# Patient Record
Sex: MALE | Race: WHITE | NOT HISPANIC OR LATINO | Employment: FULL TIME | RURAL
[De-identification: names, ages, dates, MRNs, and addresses within clinical notes are randomized per-mention and may not be internally consistent; named-entity substitution may affect disease eponyms.]

---

## 2018-12-18 ENCOUNTER — OFFICE VISIT (OUTPATIENT)
Dept: FAMILY MEDICINE CLINIC | Facility: CLINIC | Age: 23
End: 2018-12-18

## 2018-12-18 VITALS
SYSTOLIC BLOOD PRESSURE: 119 MMHG | BODY MASS INDEX: 33.05 KG/M2 | DIASTOLIC BLOOD PRESSURE: 70 MMHG | TEMPERATURE: 97.7 F | RESPIRATION RATE: 16 BRPM | HEART RATE: 87 BPM | WEIGHT: 244 LBS | HEIGHT: 72 IN | OXYGEN SATURATION: 97 %

## 2018-12-18 DIAGNOSIS — R05.9 COUGH: ICD-10-CM

## 2018-12-18 DIAGNOSIS — H66.004 RECURRENT ACUTE SUPPURATIVE OTITIS MEDIA OF RIGHT EAR WITHOUT SPONTANEOUS RUPTURE OF TYMPANIC MEMBRANE: Primary | ICD-10-CM

## 2018-12-18 PROCEDURE — 99203 OFFICE O/P NEW LOW 30 MIN: CPT | Performed by: NURSE PRACTITIONER

## 2018-12-18 RX ORDER — OFLOXACIN 3 MG/ML
SOLUTION AURICULAR (OTIC)
COMMUNITY
Start: 2018-10-07 | End: 2019-11-07

## 2018-12-18 RX ORDER — FLUTICASONE PROPIONATE 50 MCG
2 SPRAY, SUSPENSION (ML) NASAL DAILY
Qty: 1 BOTTLE | Refills: 0 | Status: SHIPPED | OUTPATIENT
Start: 2018-12-18 | End: 2019-11-07

## 2018-12-18 RX ORDER — AZITHROMYCIN 250 MG/1
TABLET, FILM COATED ORAL
Qty: 6 TABLET | Refills: 0 | Status: SHIPPED | OUTPATIENT
Start: 2018-12-18 | End: 2019-11-07

## 2018-12-18 RX ORDER — AMOXICILLIN AND CLAVULANATE POTASSIUM 875; 125 MG/1; MG/1
TABLET, FILM COATED ORAL
COMMUNITY
Start: 2018-12-11 | End: 2019-11-07

## 2018-12-18 NOTE — PROGRESS NOTES
Chief Complaint   Patient presents with   • Earache     Right ear recurring infections.        Subjective   Cosme Suarez is a 23 y.o.  male who presents today for recurring right ear infections.  He was seen first at [facility], then at [facility] and now here today.  Each time for right ear infection.    HPI:  As a child, he did have occasional ear infections but never had tubes.  No troubles until recently.  Initially, it feels like he has water in his ear, then feels clogged up, then pain that is severe.  He has no pain today.  About 12 hours after his first antibiotic dose, he saw a reduction in pain.  After 48 hours, he was pain free.  He does have some rhinorrhea and a cough that is infrequent and non-productive.  He does not take any routine medications and has been generally healthy.    Cosme Suarez  has no past medical history on file.    Allergies not on file    Current Outpatient Medications:   •  amoxicillin-clavulanate (AUGMENTIN) 875-125 MG per tablet, , Disp: , Rfl:   •  ofloxacin (FLOXIN) 0.3 % otic solution, , Disp: , Rfl:   History reviewed. No pertinent past medical history.  Past Surgical History:   Procedure Laterality Date   • TESTICLE TORSION REPAIR Right    • WISDOM TOOTH EXTRACTION       Social History     Socioeconomic History   • Marital status: Single     Spouse name: Not on file   • Number of children: Not on file   • Years of education: Not on file   • Highest education level: Not on file   Tobacco Use   • Smoking status: Never Smoker   • Smokeless tobacco: Never Used   Substance and Sexual Activity   • Alcohol use: Yes     Frequency: Never     Comment: occ   • Drug use: No   • Sexual activity: Defer     History reviewed. No pertinent family history.    Family history, surgical history, past medical history, Allergies and med's reviewed with patient today and updated in [facility] EMR.     ROS:  Review of Systems   Constitutional: Negative.    HENT: Positive for ear pain and  "rhinorrhea.         Ear pain is resolved now.   Eyes: Negative.    Respiratory: Positive for cough.    Cardiovascular: Negative.    Gastrointestinal: Negative.    Endocrine: Negative.    Genitourinary: Negative.    Musculoskeletal: Negative.    Skin: Negative.    Allergic/Immunologic: Negative.    Neurological: Negative.    Hematological: Negative.    Psychiatric/Behavioral: Negative.        OBJECTIVE:  Vitals:    12/18/18 1557   BP: 119/70   Pulse: 87   Resp: 16   Temp: 97.7 °F (36.5 °C)   SpO2: 97%   Weight: 111 kg (244 lb)   Height: 182.9 cm (72\")     Physical Exam   Constitutional: He is oriented to person, place, and time. He appears well-developed and well-nourished.   HENT:   Head: Normocephalic and atraumatic.   Right Ear: External ear normal.   Left Ear: External ear normal.   Nose: Nose normal.   Mouth/Throat: Oropharynx is clear and moist.   Eyes: Conjunctivae and EOM are normal. Pupils are equal, round, and reactive to light.   Neck: Normal range of motion. Neck supple.   Cardiovascular: Normal rate, regular rhythm and normal heart sounds.   Pulmonary/Chest: Effort normal and breath sounds normal.   Abdominal: Soft.   Musculoskeletal: Normal range of motion.   Neurological: He is alert and oriented to person, place, and time.   Skin: Skin is warm and dry.   Psychiatric: He has a normal mood and affect.   Nursing note and vitals reviewed.      ASSESSMENT/ PLAN:    Cosme was seen today for earache.    Diagnoses and all orders for this visit:    Recurrent acute suppurative otitis media of right ear without spontaneous rupture of tympanic membrane  -     fluticasone (FLONASE) 50 MCG/ACT nasal spray; 2 sprays into the nostril(s) as directed by provider Daily.  -     azithromycin (ZITHROMAX Z-DEEDEE) 250 MG tablet; Take 2 tablets the first day, then 1 tablet daily for 4 days.    Cough        Orders Placed Today:     New Medications Ordered This Visit   Medications   • fluticasone (FLONASE) 50 MCG/ACT nasal spray "     Si sprays into the nostril(s) as directed by provider Daily.     Dispense:  1 bottle     Refill:  0   • azithromycin (ZITHROMAX Z-DEEDEE) 250 MG tablet     Sig: Take 2 tablets the first day, then 1 tablet daily for 4 days.     Dispense:  6 tablet     Refill:  0        Management Plan:     An After Visit Summary was printed and given to the patient at discharge.    Follow-up: Return in about 4 weeks (around 1/15/2019) for Recheck.    Candida Fleming, EVELYN 2018 4:23 PM  This note was electronically signed.

## 2019-11-07 ENCOUNTER — OFFICE VISIT (OUTPATIENT)
Dept: FAMILY MEDICINE CLINIC | Facility: CLINIC | Age: 24
End: 2019-11-07

## 2019-11-07 VITALS
BODY MASS INDEX: 34.13 KG/M2 | WEIGHT: 252 LBS | RESPIRATION RATE: 18 BRPM | DIASTOLIC BLOOD PRESSURE: 83 MMHG | SYSTOLIC BLOOD PRESSURE: 122 MMHG | OXYGEN SATURATION: 97 % | HEIGHT: 72 IN | HEART RATE: 74 BPM

## 2019-11-07 DIAGNOSIS — M25.571 ACUTE RIGHT ANKLE PAIN: ICD-10-CM

## 2019-11-07 DIAGNOSIS — R60.0 EDEMA OF SOFT TISSUE OF RIGHT ANKLE REGION: ICD-10-CM

## 2019-11-07 DIAGNOSIS — S93.401D SPRAIN OF RIGHT ANKLE, UNSPECIFIED LIGAMENT, SUBSEQUENT ENCOUNTER: Primary | ICD-10-CM

## 2019-11-07 PROBLEM — S93.401A RIGHT ANKLE SPRAIN: Status: ACTIVE | Noted: 2019-11-07

## 2019-11-07 PROCEDURE — 99213 OFFICE O/P EST LOW 20 MIN: CPT | Performed by: NURSE PRACTITIONER

## 2019-11-07 NOTE — PROGRESS NOTES
Chief Complaint   Patient presents with   • Ankle Injury     right        Subjective   Cosme Suarez is a 24 y.o.  male who presents today for right ankle injury.    HPI:  RIGHT ANKLE: Patient was seen at [facility] ER for a R ankle sprain. He was given an air splint to wear. He states that he hasn't been wearing the splint for the last 3 days unless he is working or going to class. He states that after he stopped wearing the splint the bruising went away. He is still complaining of pain but states that it is much better. He was told to follow up with PCP in 1 week if not better. He kept the appointment today due to the bruising he was having. He was wondering if he needs an MRI. He states that when he fell and the initial injury occurred, he heard a loud pop. He states that walking on uneven ground hurts the most.     Cosme Suarez  has no past medical history on file.    No Known Allergies  No current outpatient medications on file.  History reviewed. No pertinent past medical history.  Past Surgical History:   Procedure Laterality Date   • TESTICLE TORSION REPAIR Right    • WISDOM TOOTH EXTRACTION       Social History     Socioeconomic History   • Marital status: Single     Spouse name: Not on file   • Number of children: Not on file   • Years of education: Not on file   • Highest education level: Not on file   Tobacco Use   • Smoking status: Never Smoker   • Smokeless tobacco: Never Used   Substance and Sexual Activity   • Alcohol use: Yes     Frequency: Never     Comment: occ   • Drug use: No   • Sexual activity: Defer     Family History   Problem Relation Age of Onset   • No Known Problems Mother    • No Known Problems Father        Family history, surgical history, past medical history, Allergies and med's reviewed with patient today and updated in [employer] EMR.     ROS:  Review of Systems   Constitutional: Negative.  Negative for fatigue, fever and unexpected weight change.   HENT: Negative.  Negative for  "facial swelling, sore throat and trouble swallowing.    Eyes: Negative.  Negative for photophobia, discharge and visual disturbance.   Respiratory: Negative.  Negative for cough, chest tightness and shortness of breath.    Cardiovascular: Negative.  Negative for chest pain and palpitations.   Gastrointestinal: Negative.  Negative for abdominal pain, diarrhea, nausea and vomiting.   Endocrine: Negative.  Negative for polydipsia, polyphagia and polyuria.   Genitourinary: Negative.  Negative for dysuria, flank pain and frequency.   Musculoskeletal: Positive for arthralgias and joint swelling. Negative for back pain, gait problem and neck pain.   Skin: Positive for color change. Negative for rash.        Bruising to high R ankle    Allergic/Immunologic: Negative.    Neurological: Negative.  Negative for dizziness, light-headedness and headaches.   Hematological: Negative.    Psychiatric/Behavioral: Negative.  Negative for self-injury and suicidal ideas.       OBJECTIVE:  Vitals:    11/07/19 1302   BP: 122/83   BP Location: Left arm   Patient Position: Sitting   Cuff Size: Adult   Pulse: 74   Resp: 18   SpO2: 97%   Weight: 114 kg (252 lb)   Height: 182.9 cm (72\")     Physical Exam   Constitutional: He is oriented to person, place, and time. He appears well-developed and well-nourished. No distress.   HENT:   Head: Normocephalic and atraumatic.   Eyes: Conjunctivae and EOM are normal. Pupils are equal, round, and reactive to light.   Neck: Normal range of motion. Neck supple.   Cardiovascular: Normal rate, regular rhythm, normal heart sounds and intact distal pulses.   No murmur heard.  Pulmonary/Chest: Effort normal and breath sounds normal. No respiratory distress.   Abdominal: Soft. Bowel sounds are normal. He exhibits no distension. There is no tenderness.   Musculoskeletal: Normal range of motion. He exhibits edema and tenderness.   R) ankle    Neurological: He is alert and oriented to person, place, and time. "   Skin: Skin is warm and dry. Capillary refill takes less than 2 seconds. He is not diaphoretic. No erythema.   Ecchymosis to top of foot and medial ankle.   Psychiatric: He has a normal mood and affect. His behavior is normal. Judgment and thought content normal.   Nursing note and vitals reviewed.      ASSESSMENT/ PLAN:    Cosme was seen today for ankle injury.    Diagnoses and all orders for this visit:    Sprain of right ankle, unspecified ligament, subsequent encounter    Acute right ankle pain    Edema of soft tissue of right ankle region    Continue conservative treatment as patient has achieved improvement already.  He is encouraged to wear a compression brace to the ankle for endurance activities for the next 6 months or so to give added stability.  Patient verbalizes understanding.    Orders Placed Today:     No orders of the defined types were placed in this encounter.       Management Plan:     An After Visit Summary was printed and given to the patient at discharge.    Follow-up: Return if symptoms worsen or fail to improve.    EVELYN Andrews 11/7/2019 1:31 PM  This note was electronically signed.

## 2019-11-15 ENCOUNTER — OFFICE VISIT (OUTPATIENT)
Dept: FAMILY MEDICINE CLINIC | Facility: CLINIC | Age: 24
End: 2019-11-15

## 2019-11-15 VITALS
HEIGHT: 72 IN | HEART RATE: 94 BPM | RESPIRATION RATE: 20 BRPM | OXYGEN SATURATION: 96 % | BODY MASS INDEX: 33.38 KG/M2 | WEIGHT: 246.4 LBS | DIASTOLIC BLOOD PRESSURE: 89 MMHG | SYSTOLIC BLOOD PRESSURE: 127 MMHG

## 2019-11-15 DIAGNOSIS — L02.01 ACUTE ABSCESS OF FACE: Primary | ICD-10-CM

## 2019-11-15 PROCEDURE — 10060 I&D ABSCESS SIMPLE/SINGLE: CPT | Performed by: NURSE PRACTITIONER

## 2019-11-15 RX ORDER — SULFAMETHOXAZOLE AND TRIMETHOPRIM 800; 160 MG/1; MG/1
1 TABLET ORAL 2 TIMES DAILY
Qty: 14 TABLET | Refills: 0 | Status: SHIPPED | OUTPATIENT
Start: 2019-11-15 | End: 2019-12-26

## 2019-11-15 RX ORDER — SULFAMETHOXAZOLE AND TRIMETHOPRIM 800; 160 MG/1; MG/1
1 TABLET ORAL 2 TIMES DAILY
Refills: 0 | COMMUNITY
Start: 2019-11-11 | End: 2019-11-15 | Stop reason: SDUPTHER

## 2019-11-15 NOTE — PROGRESS NOTES
Procedure   Incision & Drainage  Date/Time: 11/15/2019 4:22 PM  Performed by: Candida Fleming APRN  Authorized by: Candida Fleming APRN   Type: abscess  Body area: head/neck  Anesthesia: local infiltration    Anesthesia:  Local Anesthetic: lidocaine 1% without epinephrine  Anesthetic total: 1 mL    Sedation:  Patient sedated: no    Scalpel size: 11  Incision type: single straight  Drainage: purulent  Drainage amount: moderate  Wound treatment: wound left open  Packing material: none  Patient tolerance: Patient tolerated the procedure well with no immediate complications  Comments: Informed consent gained.  Abscess drained with no residual fluctuance.

## 2019-11-15 NOTE — PROGRESS NOTES
Chief Complaint   Patient presents with   • Abscess        Subjective   Cosme Suarez is a 24 y.o.  male who presents today for follow up abscess.    HPI:  ABSCESS:  Present anterior to left ear.  Patient has a history of MRSA skin abscesses.  He was treated at [facility] last week with Bactrim; however, the abscess has gotten larger and more painful.  It did spontaneously drain some today.  He is hopeful that the abscess can be I&D'd at this visit.    Cosme Suarez  has no past medical history on file.    No Known Allergies    Current Outpatient Medications:   •  sulfamethoxazole-trimethoprim (BACTRIM DS,SEPTRA DS) 800-160 MG per tablet, Take 1 tablet by mouth 2 (Two) Times a Day., Disp: , Rfl: 0  History reviewed. No pertinent past medical history.  Past Surgical History:   Procedure Laterality Date   • TESTICLE TORSION REPAIR Right    • WISDOM TOOTH EXTRACTION       Social History     Socioeconomic History   • Marital status: Single     Spouse name: Not on file   • Number of children: Not on file   • Years of education: Not on file   • Highest education level: Not on file   Tobacco Use   • Smoking status: Never Smoker   • Smokeless tobacco: Never Used   Substance and Sexual Activity   • Alcohol use: Yes     Frequency: Never     Comment: occ   • Drug use: No   • Sexual activity: Defer     Family History   Problem Relation Age of Onset   • No Known Problems Mother    • No Known Problems Father        Family history, surgical history, past medical history, Allergies and med's reviewed with patient today and updated in [employer] EMR.     ROS:  Review of Systems   Constitutional: Negative.  Negative for fatigue, fever and unexpected weight change.   HENT: Negative.  Negative for facial swelling, sore throat and trouble swallowing.    Eyes: Negative.  Negative for photophobia, discharge and visual disturbance.   Respiratory: Negative.  Negative for cough, chest tightness and shortness of breath.    Cardiovascular:  "Negative.  Negative for chest pain and palpitations.   Gastrointestinal: Negative.  Negative for abdominal pain, diarrhea, nausea and vomiting.   Endocrine: Negative.  Negative for polydipsia, polyphagia and polyuria.   Genitourinary: Negative.  Negative for dysuria, flank pain and frequency.   Musculoskeletal: Negative.  Negative for back pain, gait problem and neck pain.   Skin: Positive for wound. Negative for rash.        History of MRSA.   Allergic/Immunologic: Negative.    Neurological: Negative.  Negative for dizziness, light-headedness and headaches.   Hematological: Negative.    Psychiatric/Behavioral: Negative.  Negative for self-injury and suicidal ideas.       OBJECTIVE:  Vitals:    11/15/19 1541   BP: 127/89   BP Location: Left arm   Patient Position: Sitting   Cuff Size: Large Adult   Pulse: 94   Resp: 20   SpO2: 96%   Weight: 112 kg (246 lb 6.4 oz)   Height: 182.9 cm (72\")     Physical Exam   Constitutional: He is oriented to person, place, and time. He appears well-developed and well-nourished. No distress.   HENT:   Head: Normocephalic and atraumatic.   Eyes: Conjunctivae and EOM are normal. Pupils are equal, round, and reactive to light.   Neck: Normal range of motion. Neck supple.   Cardiovascular: Normal rate, regular rhythm, normal heart sounds and intact distal pulses.   No murmur heard.  Pulmonary/Chest: Effort normal and breath sounds normal. No respiratory distress.   Abdominal: Soft. Bowel sounds are normal. He exhibits no distension. There is no tenderness.   Musculoskeletal: Normal range of motion. He exhibits no edema.   Neurological: He is alert and oriented to person, place, and time.   Skin: Skin is warm and dry. Capillary refill takes less than 2 seconds. He is not diaphoretic. There is erythema.   There is a 2 cm fluctuance, tender, erythematous abscess present just anterior to the left pinna.  It is tender to manipulate and there is a small amount of drainage that can be expressed, " but only a small amount.  SEE PROCEDURE NOTE.   Psychiatric: He has a normal mood and affect. His behavior is normal. Judgment and thought content normal.   Nursing note and vitals reviewed.      ASSESSMENT/ PLAN:    There are no diagnoses linked to this encounter.    Orders Placed Today:     No orders of the defined types were placed in this encounter.       Management Plan:     An After Visit Summary was printed and given to the patient at discharge.    Follow-up: No Follow-up on file.    EVELYN Andrews 11/15/2019 4:00 PM  This note was electronically signed.

## 2019-11-19 LAB
BACTERIA SPEC ANAEROBE CULT: NORMAL
BACTERIA SPEC CULT: NORMAL

## 2019-12-26 ENCOUNTER — OFFICE VISIT (OUTPATIENT)
Dept: FAMILY MEDICINE CLINIC | Facility: CLINIC | Age: 24
End: 2019-12-26

## 2019-12-26 VITALS
RESPIRATION RATE: 20 BRPM | WEIGHT: 242.8 LBS | HEART RATE: 67 BPM | BODY MASS INDEX: 32.89 KG/M2 | DIASTOLIC BLOOD PRESSURE: 67 MMHG | HEIGHT: 72 IN | SYSTOLIC BLOOD PRESSURE: 125 MMHG | OXYGEN SATURATION: 98 %

## 2019-12-26 DIAGNOSIS — R63.4 WEIGHT LOSS: ICD-10-CM

## 2019-12-26 DIAGNOSIS — L02.01 ACUTE ABSCESS OF FACE: Primary | ICD-10-CM

## 2019-12-26 DIAGNOSIS — H66.002 NON-RECURRENT ACUTE SUPPURATIVE OTITIS MEDIA OF LEFT EAR WITHOUT SPONTANEOUS RUPTURE OF TYMPANIC MEMBRANE: ICD-10-CM

## 2019-12-26 PROCEDURE — 99213 OFFICE O/P EST LOW 20 MIN: CPT | Performed by: NURSE PRACTITIONER

## 2019-12-26 RX ORDER — AMOXICILLIN AND CLAVULANATE POTASSIUM 875; 125 MG/1; MG/1
1 TABLET, FILM COATED ORAL 2 TIMES DAILY
Qty: 20 TABLET | Refills: 0 | Status: SHIPPED | OUTPATIENT
Start: 2019-12-26 | End: 2020-01-05

## 2019-12-26 NOTE — PROGRESS NOTES
Chief Complaint   Patient presents with   • Abscess     Face        Subjective   Cosme Suarez is a 24 y.o.  male who presents today for abscess on face.    HPI:  ABSCESS:  This is a chronic problem revisited.  It never cleared 100% when treated in November.  He has had no further treatment since that time.    Cosme Suarez  has no past medical history on file.    No Known Allergies    Current Outpatient Medications:   •  amoxicillin-clavulanate (AUGMENTIN) 875-125 MG per tablet, Take 1 tablet by mouth 2 (Two) Times a Day for 10 days., Disp: 20 tablet, Rfl: 0  History reviewed. No pertinent past medical history.  Past Surgical History:   Procedure Laterality Date   • TESTICLE TORSION REPAIR Right    • WISDOM TOOTH EXTRACTION       Social History     Socioeconomic History   • Marital status: Single     Spouse name: Not on file   • Number of children: Not on file   • Years of education: Not on file   • Highest education level: Not on file   Tobacco Use   • Smoking status: Never Smoker   • Smokeless tobacco: Never Used   Substance and Sexual Activity   • Alcohol use: Yes     Frequency: Never     Comment: occ   • Drug use: No   • Sexual activity: Defer     Family History   Problem Relation Age of Onset   • No Known Problems Mother    • No Known Problems Father        Family history, surgical history, past medical history, Allergies and med's reviewed with patient today and updated in [employer] EMR.     ROS:  Review of Systems   Constitutional: Negative.  Negative for fatigue, fever and unexpected weight change.   HENT: Negative.  Negative for facial swelling, sore throat and trouble swallowing.    Eyes: Negative.  Negative for photophobia, discharge and visual disturbance.   Respiratory: Negative.  Negative for cough, chest tightness and shortness of breath.    Cardiovascular: Negative.  Negative for chest pain and palpitations.   Gastrointestinal: Negative.  Negative for abdominal pain, diarrhea, nausea and vomiting.  "  Endocrine: Negative.  Negative for polydipsia, polyphagia and polyuria.   Genitourinary: Negative.  Negative for dysuria, flank pain and frequency.   Musculoskeletal: Negative.  Negative for back pain, gait problem and neck pain.   Skin: Positive for wound. Negative for rash.        Abscess on face   Allergic/Immunologic: Negative.    Neurological: Negative.  Negative for dizziness, light-headedness and headaches.   Hematological: Negative.    Psychiatric/Behavioral: Negative.  Negative for self-injury and suicidal ideas.       OBJECTIVE:  Vitals:    12/26/19 1103   BP: 125/67   BP Location: Left arm   Patient Position: Sitting   Cuff Size: Adult   Pulse: 67   Resp: 20   SpO2: 98%   Weight: 110 kg (242 lb 12.8 oz)   Height: 182.9 cm (72\")     Physical Exam   Constitutional: He is oriented to person, place, and time. He appears well-developed and well-nourished. No distress.   HENT:   Head: Normocephalic and atraumatic.   Right Ear: External ear normal.   Left Ear: External ear normal.   Nose: Nose normal.   Mouth/Throat: Oropharynx is clear and moist.   Left TM with suppurative middle ear effusion and erythematous TM.   Eyes: Pupils are equal, round, and reactive to light. Conjunctivae and EOM are normal.   Neck: Normal range of motion. Neck supple.   Cardiovascular: Normal rate, regular rhythm, normal heart sounds and intact distal pulses.   No murmur heard.  Pulmonary/Chest: Effort normal and breath sounds normal. No respiratory distress.   Abdominal: Soft. Bowel sounds are normal. He exhibits no distension. There is no tenderness.   Musculoskeletal: Normal range of motion. He exhibits no edema.   Neurological: He is alert and oriented to person, place, and time.   Skin: Skin is warm and dry. Capillary refill takes less than 2 seconds. He is not diaphoretic. There is erythema.   1 cm pustule anterior to left pinna.  Tender and fluctuant.  No drainage present.   Psychiatric: He has a normal mood and affect. His " behavior is normal. Judgment and thought content normal.   Nursing note and vitals reviewed.      ASSESSMENT/ PLAN:    Cosme was seen today for abscess.    Diagnoses and all orders for this visit:    Acute abscess of face  -     amoxicillin-clavulanate (AUGMENTIN) 875-125 MG per tablet; Take 1 tablet by mouth 2 (Two) Times a Day for 10 days.    Non-recurrent acute suppurative otitis media of left ear without spontaneous rupture of tympanic membrane    Weight loss    Augmentin is also treating L) OM.    Orders Placed Today:     New Medications Ordered This Visit   Medications   • amoxicillin-clavulanate (AUGMENTIN) 875-125 MG per tablet     Sig: Take 1 tablet by mouth 2 (Two) Times a Day for 10 days.     Dispense:  20 tablet     Refill:  0        Management Plan:     An After Visit Summary was printed and given to the patient at discharge.    Follow-up: Return in about 2 weeks (around 1/9/2020) for Recheck.    Candida Fleming, EVELYN 12/26/2019 11:19 AM  This note was electronically signed.

## 2020-01-16 ENCOUNTER — TELEPHONE (OUTPATIENT)
Dept: FAMILY MEDICINE CLINIC | Facility: CLINIC | Age: 25
End: 2020-01-16

## 2020-01-16 DIAGNOSIS — L02.01 ACUTE ABSCESS OF FACE: Primary | ICD-10-CM

## 2020-01-16 NOTE — TELEPHONE ENCOUNTER
Pt mother called. Stated that Pt is still have discharge from [facility]. She is asking if a referral for dermatology can be placed for Pt? Prefers office in [place] .

## 2020-08-13 ENCOUNTER — OFFICE VISIT (OUTPATIENT)
Dept: FAMILY MEDICINE CLINIC | Facility: CLINIC | Age: 25
End: 2020-08-13

## 2020-08-13 VITALS
DIASTOLIC BLOOD PRESSURE: 82 MMHG | WEIGHT: 258.2 LBS | HEIGHT: 72 IN | BODY MASS INDEX: 34.97 KG/M2 | SYSTOLIC BLOOD PRESSURE: 133 MMHG | HEART RATE: 83 BPM | OXYGEN SATURATION: 98 % | TEMPERATURE: 98.3 F

## 2020-08-13 DIAGNOSIS — H66.005 RECURRENT ACUTE SUPPURATIVE OTITIS MEDIA WITHOUT SPONTANEOUS RUPTURE OF LEFT TYMPANIC MEMBRANE: Primary | ICD-10-CM

## 2020-08-13 DIAGNOSIS — H60.93 RECURRENT OTITIS EXTERNA OF BOTH EARS: ICD-10-CM

## 2020-08-13 DIAGNOSIS — L02.01 FACIAL ABSCESS: ICD-10-CM

## 2020-08-13 PROBLEM — H92.09 OTALGIA: Status: ACTIVE | Noted: 2019-01-29

## 2020-08-13 PROBLEM — H66.90 ACUTE OTITIS MEDIA: Status: ACTIVE | Noted: 2019-01-29

## 2020-08-13 PROCEDURE — 99213 OFFICE O/P EST LOW 20 MIN: CPT | Performed by: NURSE PRACTITIONER

## 2020-08-13 RX ORDER — CEFDINIR 300 MG/1
300 CAPSULE ORAL 2 TIMES DAILY
Qty: 20 CAPSULE | Refills: 0 | Status: SHIPPED | OUTPATIENT
Start: 2020-08-13 | End: 2021-04-09

## 2020-08-13 NOTE — PROGRESS NOTES
"Chief Complaint   Patient presents with   • Earache   • face abscess     recheck        Subjective   Cosme Suarez is a 24 y.o.  male who presents today for earache.    HPI:  EARACHE:  Both ears started bothering him again about 5 days ago.  Both were initially bad; however, the right ear feels \"lots better\" while the left ear hurts worse.  There has been drainage from both canals that was foul smelling.  He has been taken ibuprofen for pain and inflammation.  No fever.  ABSCESS:  There is a recurrent area beneath the left ear that swells, is red and painful.  This has presented again since his ears are bothering him.  No treatment.  No drainage.    Cosme Suarez  has no past medical history on file.    No Known Allergies    Current Outpatient Medications:   •  cefdinir (OMNICEF) 300 MG capsule, Take 1 capsule by mouth 2 (Two) Times a Day., Disp: 20 capsule, Rfl: 0  •  neomycin-polymyxin-hydrocortisone (CORTISPORIN) 3.5-91040-7 otic solution, Administer 3 drops into both ears 3 (Three) Times a Day., Disp: 10 mL, Rfl: 0  History reviewed. No pertinent past medical history.  Past Surgical History:   Procedure Laterality Date   • TESTICLE TORSION REPAIR Right    • WISDOM TOOTH EXTRACTION       Social History     Socioeconomic History   • Marital status: Single     Spouse name: Not on file   • Number of children: Not on file   • Years of education: Not on file   • Highest education level: Not on file   Tobacco Use   • Smoking status: Never Smoker   • Smokeless tobacco: Never Used   Substance and Sexual Activity   • Alcohol use: Yes     Frequency: Never     Comment: occ   • Drug use: No   • Sexual activity: Defer     Family History   Problem Relation Age of Onset   • No Known Problems Mother    • No Known Problems Father        Family history, surgical history, past medical history, Allergies and med's reviewed with patient today and updated in [facility] EMR.     ROS:  Review of Systems   Constitutional: Negative.  " "Negative for fatigue, fever and unexpected weight change.   HENT: Positive for ear discharge and ear pain. Negative for facial swelling, sore throat and trouble swallowing.    Eyes: Negative.  Negative for photophobia, discharge and visual disturbance.   Respiratory: Negative.  Negative for cough, chest tightness and shortness of breath.    Cardiovascular: Negative.  Negative for chest pain and palpitations.   Gastrointestinal: Negative.  Negative for abdominal pain, diarrhea, nausea and vomiting.   Endocrine: Negative.  Negative for polydipsia, polyphagia and polyuria.   Genitourinary: Negative.  Negative for dysuria, flank pain and frequency.   Musculoskeletal: Negative.  Negative for back pain, gait problem and neck pain.   Skin: Positive for color change. Negative for rash.        Recurrent abscess.   Allergic/Immunologic: Negative.    Neurological: Negative.  Negative for dizziness, light-headedness and headaches.   Hematological: Negative.    Psychiatric/Behavioral: Negative.  Negative for self-injury and suicidal ideas.       OBJECTIVE:  Vitals:    08/13/20 0718 08/13/20 0722   BP: 145/86 133/82   BP Location: Left arm Right arm   Patient Position: Sitting Sitting   Cuff Size: Large Adult Adult   Pulse: 83    Temp: 98.3 °F (36.8 °C)    TempSrc: Skin    SpO2: 98%    Weight: 117 kg (258 lb 3.2 oz)    Height: 182.9 cm (72\")      Physical Exam   Constitutional: He is oriented to person, place, and time. He appears well-developed and well-nourished. No distress.   HENT:   Head: Normocephalic and atraumatic.   Right Ear: Hearing normal. There is drainage. Tympanic membrane is erythematous and bulging. A middle ear effusion is present.   Left Ear: Hearing normal. There is drainage, swelling and tenderness. Tympanic membrane is erythematous and bulging. A middle ear effusion is present.   Nose: Nose normal.   Mouth/Throat: Oropharynx is clear and moist.   Eyes: Pupils are equal, round, and reactive to light. " Conjunctivae and EOM are normal.   Neck: Normal range of motion. Neck supple.   Cardiovascular: Normal rate, regular rhythm, normal heart sounds and intact distal pulses.   No murmur heard.  Pulmonary/Chest: Effort normal and breath sounds normal. No respiratory distress.   Abdominal: Soft. Bowel sounds are normal. He exhibits no distension. There is no tenderness.   Musculoskeletal: Normal range of motion. He exhibits no edema.   Neurological: He is alert and oriented to person, place, and time.   Skin: Skin is warm and dry. Capillary refill takes less than 2 seconds. He is not diaphoretic. No erythema.        Psychiatric: He has a normal mood and affect. His behavior is normal. Judgment and thought content normal.   Nursing note and vitals reviewed.      ASSESSMENT/ PLAN:    Cosme was seen today for earache and face abscess.    Diagnoses and all orders for this visit:    Recurrent acute suppurative otitis media without spontaneous rupture of left tympanic membrane  -     Ambulatory Referral to ENT (Otolaryngology)  -     cefdinir (OMNICEF) 300 MG capsule; Take 1 capsule by mouth 2 (Two) Times a Day.    Recurrent otitis externa of both ears  -     Ambulatory Referral to ENT (Otolaryngology)  -     neomycin-polymyxin-hydrocortisone (CORTISPORIN) 3.5-48063-6 otic solution; Administer 3 drops into both ears 3 (Three) Times a Day.  -     cefdinir (OMNICEF) 300 MG capsule; Take 1 capsule by mouth 2 (Two) Times a Day.    Facial abscess  -     Ambulatory Referral to ENT (Otolaryngology)  -     cefdinir (OMNICEF) 300 MG capsule; Take 1 capsule by mouth 2 (Two) Times a Day.        Orders Placed Today:     New Medications Ordered This Visit   Medications   • neomycin-polymyxin-hydrocortisone (CORTISPORIN) 3.5-24929-8 otic solution     Sig: Administer 3 drops into both ears 3 (Three) Times a Day.     Dispense:  10 mL     Refill:  0   • cefdinir (OMNICEF) 300 MG capsule     Sig: Take 1 capsule by mouth 2 (Two) Times a Day.      Dispense:  20 capsule     Refill:  0        Management Plan:     An After Visit Summary was printed and given to the patient at discharge.    Follow-up: Return if symptoms worsen or fail to improve.    Candida Fleming, APRN 8/13/2020 07:43  This note was electronically signed.

## 2021-04-09 ENCOUNTER — OFFICE VISIT (OUTPATIENT)
Dept: FAMILY MEDICINE CLINIC | Facility: CLINIC | Age: 26
End: 2021-04-09

## 2021-04-09 VITALS
HEIGHT: 72 IN | HEART RATE: 70 BPM | SYSTOLIC BLOOD PRESSURE: 129 MMHG | TEMPERATURE: 98 F | DIASTOLIC BLOOD PRESSURE: 82 MMHG | WEIGHT: 263 LBS | BODY MASS INDEX: 35.62 KG/M2

## 2021-04-09 DIAGNOSIS — L02.01 FACIAL ABSCESS: Primary | ICD-10-CM

## 2021-04-09 DIAGNOSIS — S31.109A OPEN WOUND OF GROIN WITHOUT COMPLICATION, INITIAL ENCOUNTER: ICD-10-CM

## 2021-04-09 PROCEDURE — 99213 OFFICE O/P EST LOW 20 MIN: CPT | Performed by: NURSE PRACTITIONER

## 2021-04-09 NOTE — PROGRESS NOTES
"Chief Complaint  Abscess (on face)    Cody Suarez presents to [facility]  History of Present Illness  RECURRENT ABSCESS:  Patient presents once again with abscess on the left face by his ear.  Currently it is not painful but always tender to touch.  He did not follow up as referred last year because he asked for the wrong day off from work and never rescheduled.  However, last year he had recurrent ear infection associated with the abscess.  He has had no further incident of ear pain or ear drainage.  PENILE WOUND:  Patient states he has 2 small non-healing areas on his penis.  He has not been in a relationship in 18 months and has had no sexual activity since that time.  It does not hurt badly but the area is sensitive to touch and wash.    Objective   Vital Signs:   /82 (BP Location: Left arm, Patient Position: Sitting, Cuff Size: Large Adult)   Pulse 70   Temp 98 °F (36.7 °C)   Ht 182.9 cm (72\") Comment: pt reported  Wt 119 kg (263 lb)   BMI 35.67 kg/m²       Physical Exam  Vitals and nursing note reviewed. Exam conducted with a chaperone present.   Constitutional:       General: He is not in acute distress.     Appearance: Normal appearance. He is obese. He is not ill-appearing.   HENT:      Head: Normocephalic and atraumatic.        Right Ear: Tympanic membrane, ear canal and external ear normal.      Left Ear: Tympanic membrane, ear canal and external ear normal.      Nose: Nose normal.      Mouth/Throat:      Mouth: Mucous membranes are moist.      Pharynx: Oropharynx is clear.   Eyes:      Conjunctiva/sclera: Conjunctivae normal.      Pupils: Pupils are equal, round, and reactive to light.   Cardiovascular:      Rate and Rhythm: Normal rate and regular rhythm.      Heart sounds: Normal heart sounds. No murmur heard.     Pulmonary:      Effort: Pulmonary effort is normal.      Breath sounds: Normal breath sounds.   Genitourinary:     Penis: " Uncircumcised. Lesions present.       Testes: Normal. Cremasteric reflex is present.      Osvaldo stage (genital): 5.       Musculoskeletal:      Cervical back: Normal range of motion and neck supple.   Lymphadenopathy:      Cervical: No cervical adenopathy.   Neurological:      Mental Status: He is alert.        Result Review :                 Assessment and Plan    Diagnoses and all orders for this visit:    1. Facial abscess (Primary)  -     Ambulatory Referral to Dermatology    2. Open wound of groin without complication, initial encounter  -     nystatin-triamcinolone (MYCOLOG II) 575710-2.1 UNIT/GM-% cream; Apply  topically to the appropriate area as directed 2 (Two) Times a Day.  Dispense: 15 g; Refill: 1        Follow Up   Return if symptoms worsen or fail to improve.  Patient was given instructions and counseling regarding his condition or for health maintenance advice. Please see specific information pulled into the AVS if appropriate.

## 2021-10-18 ENCOUNTER — HOSPITAL ENCOUNTER (EMERGENCY)
Facility: HOSPITAL | Age: 26
Discharge: HOME | End: 2021-10-18
Attending: EMERGENCY MEDICINE | Admitting: EMERGENCY MEDICINE

## 2021-10-18 ENCOUNTER — APPOINTMENT (OUTPATIENT)
Dept: ULTRASOUND IMAGING | Facility: HOSPITAL | Age: 26
End: 2021-10-18

## 2021-10-18 VITALS
OXYGEN SATURATION: 100 % | DIASTOLIC BLOOD PRESSURE: 101 MMHG | RESPIRATION RATE: 16 BRPM | SYSTOLIC BLOOD PRESSURE: 154 MMHG | WEIGHT: 264 LBS | BODY MASS INDEX: 35.76 KG/M2 | TEMPERATURE: 98.1 F | HEIGHT: 72 IN | HEART RATE: 85 BPM

## 2021-10-18 DIAGNOSIS — N50.812 PAIN IN BOTH TESTICLES: Primary | ICD-10-CM

## 2021-10-18 DIAGNOSIS — N50.811 PAIN IN BOTH TESTICLES: Primary | ICD-10-CM

## 2021-10-18 LAB
BILIRUB UR QL STRIP: NEGATIVE
CLARITY UR: CLEAR
COLOR UR: YELLOW
GLUCOSE UR STRIP-MCNC: NEGATIVE MG/DL
HGB UR QL STRIP.AUTO: NEGATIVE
KETONES UR QL STRIP: NEGATIVE
LEUKOCYTE ESTERASE UR QL STRIP.AUTO: NEGATIVE
NITRITE UR QL STRIP: NEGATIVE
PH UR STRIP.AUTO: 6 [PH] (ref 5–8)
PROT UR QL STRIP: NEGATIVE
SP GR UR STRIP: 1.01 (ref 1–1.03)
UROBILINOGEN UR QL STRIP: NORMAL

## 2021-10-18 PROCEDURE — 99283 EMERGENCY DEPT VISIT LOW MDM: CPT

## 2021-10-18 PROCEDURE — 81003 URINALYSIS AUTO W/O SCOPE: CPT | Performed by: EMERGENCY MEDICINE

## 2021-10-18 PROCEDURE — 76870 US EXAM SCROTUM: CPT

## 2021-10-18 RX ORDER — HYDROCODONE BITARTRATE AND ACETAMINOPHEN 7.5; 325 MG/1; MG/1
1 TABLET ORAL ONCE
Status: COMPLETED | OUTPATIENT
Start: 2021-10-18 | End: 2021-10-18

## 2021-10-18 RX ADMIN — HYDROCODONE BITARTRATE AND ACETAMINOPHEN 1 TABLET: 7.5; 325 TABLET ORAL at 20:24

## 2021-10-19 NOTE — ED TRIAGE NOTES
Patient states he started having testicle pain around 1500 and then 1630. Patient c/o both testicles hurting. 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 Denies painful urination.

## 2021-10-19 NOTE — ED PROVIDER NOTES
Emergency Medicine Provider Note    Subjective:    HISTORY OF PRESENT ILLNESS     This is a very pleasant 26 y.o. male with a past medical history of prior testicular torsion who presents to the emergency department today with a chief complaint of testicle pain. Earlier this afternoon after working all day the patient had gradual onset bilateral testicle pain. It is constant, moderate, dull, worse on the left. No exacerbating or relieving factors and no associated symptoms. He has no fevers or chills, no dysuria. He has no abdominal pain, nausea, or vomiting.           History is obtained from the patient.       Review of Systems: All other systems are reviewed and are negative other than noted in the HPI.    Past Medical History:  History reviewed. No pertinent past medical history.    Allergies:  No Known Allergies    Past Surgical History:  Past Surgical History:   Procedure Laterality Date   • ANKLE ARTHROPLASTY     • TESTICLE TORSION REPAIR Right    • WISDOM TOOTH EXTRACTION         Family History:  Family History   Problem Relation Age of Onset   • No Known Problems Mother    • No Known Problems Father        Social History:  Social History     Socioeconomic History   • Marital status: Single   Tobacco Use   • Smoking status: Never Smoker   • Smokeless tobacco: Never Used   Substance and Sexual Activity   • Alcohol use: Yes     Comment: occ   • Drug use: No   • Sexual activity: Defer       Home Medications:  Prior to Admission medications    Medication Sig Start Date End Date Taking? Authorizing Provider   nystatin-triamcinolone (MYCOLOG II) 596825-0.1 UNIT/GM-% cream Apply  topically to the appropriate area as directed 2 (Two) Times a Day. 4/9/21   Candida Fleming, EVELYN         Objective:    PHYSICAL EXAM     Vitals:   Vitals:    10/18/21 1910   BP: (!) 154/101   Pulse: 85   Resp: 16   Temp: 98.1 °F (36.7 °C)   SpO2: 100%     GENERAL: Well appearing, in no acute distress.   HEENT: Moist mucous membranes,  oropharynx clear without lesions, exudates, thrush.   EYES: No scleral icterus, conjunctivae clear.   NECK: No cervical lymphadenopathy, no stiffness.  CARDIAC: Normal rate, regular rhythm, no murmurs, 2+ peripheral pulses in all four extremities, normal capillary refill.   PULMONARY: Normal work of breathing on room air, lungs are clear to auscultation bilaterally without wheezes, crackles, rhonchi.  ABDOMINAL: Normal bowel sounds, abdomen is soft, non-tender, non-distended, no hepatomegaly or splenomegaly.   : no testicular tenderness, no erythema. Cremasteric reflex bilaterally. No rashes.   MUSCULOSKELETAL: Normal range of motion, no lower extremity edema.  NEUROLOGIC: Alert and oriented x 3, EOM grossly intact and moves all four extremities with normal strength.  SKIN: Warm and dry without rashes.   PSYCHIATRIC: Mood and affect are normal.     PROCEDURES     Procedures    LAB AND RADIOLOGY RESULTS     Lab Results (last 24 hours)     Procedure Component Value Units Date/Time    Urinalysis With Culture If Indicated - Urine, Clean Catch [[id number]]  (Normal) Collected: 10/18/21 1947    Specimen: Urine, Clean Catch Updated: 10/18/21 2002     Color, UA Yellow     Appearance, UA Clear     pH, UA 6.0     Specific Gravity, UA 1.010     Glucose, UA Negative     Ketones, UA Negative     Bilirubin, UA Negative     Blood, UA Negative     Protein, UA Negative     Leuk Esterase, UA Negative     Nitrite, UA Negative     Urobilinogen, UA 0.2 E.U./dL    Narrative:      Urine microscopic not indicated.          US Scrotum & Testicles    Result Date: 10/18/2021  Narrative: US SCROTUM AND TESTICLES- 10/18/2021 8:01 PM CDT  REASON FOR EXAM: testicle pain; N50.811-Right testicular pain; N50.812-Left testicular pain   COMPARISON: None  TECHNIQUE: Multiple longitudinal and real-time ultrasound images of the scrotum are obtained with limited Doppler analysis of the bilateral testicles.  FINDINGS:   RIGHT TESTICLE: 4.7 x 2.2 x 3.3 cm.  Normal in size and echogenicity without focal mass or abnormality. Normal arterial and venous flow.  RIGHT EPIDIDYMIS: Normal in size and echogenicity.  LEFT TESTICLE: 4.3 x 2.2 x 3.6 cm. Normal in size and echogenicity without focal mass or abnormality. Normal arterial and venous flow.  LEFT EPIDIDYMIS: Normal in size and echogenicity.  SCROTAL SPACE: Normal physiologic fluid surrounds bilateral testicles.      Impression: 1. Unremarkable scrotal ultrasound.  This report was finalized on 10/18/2021 20:34 by Dr Deejay Ibarra, .      ED course:    Medications   HYDROcodone-acetaminophen (NORCO) 7.5-325 MG per tablet 1 tablet (1 tablet Oral Given 10/18/21 2024)          Amount and/or complexity of data reviewed:    • Clinical lab tests ordered and reviewed.  • Tests in the radiology section ordered and reviewed.        Risk of significant complications, morbidity, and/or mortality.    •  Presenting problem: high  •  Diagnostic procedures: moderate  •  Management options: high        MEDICAL DECISION MAKING     Patient presents with bilateral testicle pain. Upon arrival to the Emergency Department he is in no acute distress and his vitals are reassuring. He is given norco and evaluated with a UA and an US of the testicles, both of which are reassuring.History is inconsistent with intermittent torsion.  He is already had bilateral orchiopexies.  Physical exam, history, urinalysis are inconsistent with epididymitis.  He has no signs of hernia on exam.  He has no abdominal pain, tenderness, nausea, or vomiting.  He has no hernia palpable on examination.  He is better upon reevaluation. I discussed all of the findings with the patient and he verbalized understanding. I explained that there is always diagnostic uncertainty in the ER and this could be an early presentation of a process not detected at this time so he should return for any new or worsening symptoms and should follow up rapidly with his primary care provider  for further evaluation and management. he is discharged in good condition with reassuring vitals and is given commonsense return precautions which he verbalizes understanding of.          Diagnosis:    Final diagnoses:   Pain in both testicles         ED Disposition:     ED Disposition     ED Disposition Condition Comment    Discharge Stable           Candida Fleming, APRN  [address] [facility] [address]  [phone]    Schedule an appointment as soon as possible for a visit in 2 days           Medication List      No changes were made to your prescriptions during this visit.              Mynor Mendoza MD  10/19/21 6434

## 2021-10-26 ENCOUNTER — OFFICE VISIT (OUTPATIENT)
Dept: FAMILY MEDICINE CLINIC | Facility: CLINIC | Age: 26
End: 2021-10-26

## 2021-10-26 VITALS
TEMPERATURE: 97.9 F | OXYGEN SATURATION: 98 % | DIASTOLIC BLOOD PRESSURE: 80 MMHG | SYSTOLIC BLOOD PRESSURE: 137 MMHG | BODY MASS INDEX: 35.54 KG/M2 | WEIGHT: 262.4 LBS | HEIGHT: 72 IN | HEART RATE: 103 BPM

## 2021-10-26 DIAGNOSIS — N50.819 PERSISTENT TESTICULAR PAIN: Primary | ICD-10-CM

## 2021-10-26 PROCEDURE — 99213 OFFICE O/P EST LOW 20 MIN: CPT | Performed by: NURSE PRACTITIONER

## 2021-10-26 NOTE — PROGRESS NOTES
"Chief Complaint  Groin Pain (feels like \"someone has grabbed his testicles and is pulling\", 10.18.21 [facility] ER, nothing was seen in urine or ultrasound )    Cody Suarez presents to [facility]  History of Present Illness  Patient was seen 10/18/2021 at [facility] ER for testicular pain.  He underwent ultrasound and UA.  Both were normal.  He has no swelling.  Pain is consistent daily from time of arising to retiring.  Pain is worse with lifting or activity.  After a period of time it returns to his baseline which is a constant sharp pain.  Left is slightly worse than the right.  Objective   Vital Signs:   /80 (BP Location: Left arm, Patient Position: Sitting, Cuff Size: Large Adult)   Pulse 103   Temp 97.9 °F (36.6 °C)   Ht 182.9 cm (72\") Comment: pt reported  Wt 119 kg (262 lb 6.4 oz)   SpO2 98%   BMI 35.59 kg/m²     Physical Exam  Vitals and nursing note reviewed.   Constitutional:       General: He is not in acute distress.     Appearance: Normal appearance. He is obese. He is not ill-appearing.   HENT:      Head: Normocephalic and atraumatic.   Cardiovascular:      Rate and Rhythm: Regular rhythm. Tachycardia present.      Heart sounds: Normal heart sounds.   Pulmonary:      Effort: Pulmonary effort is normal.      Breath sounds: Normal breath sounds.   Abdominal:      General: Bowel sounds are normal.      Palpations: Abdomen is soft.      Tenderness: There is no abdominal tenderness.   Genitourinary:     Testes: Cremasteric reflex is present.         Right: Tenderness present. Mass or swelling not present.         Left: Tenderness present. Mass or swelling not present.      Epididymis:      Right: Normal.      Left: Normal.      Comments: Mild bilateral tenderness (equal) is present with palpation.  Neurological:      Mental Status: He is alert.        Result Review :                 Assessment and Plan    Diagnoses and all orders for this visit:    1. " Persistent testicular pain (Primary)  -     Ambulatory Referral to Urology        Follow Up   Return if symptoms worsen or fail to improve.  Patient was given instructions and counseling regarding his condition or for health maintenance advice. Please see specific information pulled into the AVS if appropriate.

## 2021-11-18 ENCOUNTER — TELEPHONE (OUTPATIENT)
Dept: UROLOGY | Facility: CLINIC | Age: 26
End: 2021-11-18

## 2021-11-18 NOTE — TELEPHONE ENCOUNTER
I tried to contact the patient to let him know that he needed to have a ultrasound prior to his visit but no answer and no voicemail to leave a message.

## 2021-11-18 NOTE — PROGRESS NOTES
Subjective    Mr. Suarez is 26 y.o. male    Chief Complaint:Persistent testicular pain.    History of Present Illness  Patient is a 26-year-old gentleman who presents as a new patient to [facility] urology with a new problem of testicular pain.  He presented to the emergency department 10/18/2021 with worsening bilateral testicular pain.  He had a previous history of testicular torsion that required surgery several years ago however ultrasound on the time of the ER visit and physical exam revealed this not to be a torsion the pain has improved but is just now more of a constant dull pain.  It does seem to be exacerbated by lifting and straining.  Scrotal ultrasound revealed no abnormalities.    The following portions of the patient's history were reviewed and updated as appropriate: allergies, current medications, past family history, past medical history, past social history, past surgical history and problem list.    Review of Systems   Constitutional: Negative for appetite change and fever.   HENT: Negative for hearing loss and sore throat.    Eyes: Negative for pain and redness.   Respiratory: Negative for cough and shortness of breath.    Cardiovascular: Negative for chest pain and leg swelling.   Gastrointestinal: Negative for anal bleeding, nausea and vomiting.   Endocrine: Negative for cold intolerance and heat intolerance.   Genitourinary: Positive for testicular pain. Negative for decreased urine volume, difficulty urinating, dysuria, enuresis, flank pain, frequency, genital sores, hematuria, penile discharge, penile pain, penile swelling, scrotal swelling and urgency.   Musculoskeletal: Negative for joint swelling and myalgias.   Skin: Negative for color change and rash.   Allergic/Immunologic: Negative for immunocompromised state.   Neurological: Negative for dizziness and speech difficulty.   Hematological: Negative for adenopathy. Does not bruise/bleed easily.   Psychiatric/Behavioral: Negative for  "dysphoric mood and suicidal ideas.       No current outpatient medications on file.    History reviewed. No pertinent past medical history.    Past Surgical History:   Procedure Laterality Date   • ANKLE ARTHROPLASTY     • TESTICLE TORSION REPAIR Right    • WISDOM TOOTH EXTRACTION         Social History     Socioeconomic History   • Marital status: Single   Tobacco Use   • Smoking status: Never Smoker   • Smokeless tobacco: Never Used   Vaping Use   • Vaping Use: Never used   Substance and Sexual Activity   • Alcohol use: Yes     Comment: occasional    • Drug use: No   • Sexual activity: Defer       Family History   Problem Relation Age of Onset   • No Known Problems Mother    • No Known Problems Father    • No Known Problems Maternal Aunt    • No Known Problems Maternal Uncle    • No Known Problems Paternal Aunt    • No Known Problems Paternal Uncle    • Cancer Paternal Grandmother    • No Known Problems Paternal Grandfather    • No Known Problems Maternal Grandfather    • No Known Problems Other        Objective    Temp 98.7 °F (37.1 °C)   Ht 182.9 cm (72\")   Wt 120 kg (265 lb)   BMI 35.94 kg/m²     Physical Exam  Vitals reviewed.   Constitutional:       Appearance: Normal appearance.   HENT:      Head: Normocephalic and atraumatic.      Right Ear: External ear normal.      Left Ear: External ear normal.   Pulmonary:      Effort: Pulmonary effort is normal.   Genitourinary:     Comments: I performed physical exam on his external genitalia no external abnormalities noted no erythema cellulitis swelling noted.  There was mild tenderness to palpation of both testicles but the testicles of Cialis were normal with no lesions nodules or any suspicious findings for testicular cancer.  Neurological:      General: No focal deficit present.      Mental Status: He is alert and oriented to person, place, and time.   Psychiatric:         Mood and Affect: Mood normal.         Behavior: Behavior normal.         Scrotal " ultrasound independent review  I inspected the scrotal ultrasound images I inspected both testicles which are normal in appearance no obvious findings suspicious for testicular cancer no orchitis findings.  No abnormalities of the epididymis.  Assessment and Plan    Diagnoses and all orders for this visit:    1. Pain in both testicles (Primary)    Patient had approximately 1 month of bilateral testicular pain which has improved since initial onset 10/18/2021 when he went to the emergency department he did have a previous history of torsion which he did not have I recommend Aleve every 12 hours as needed good scrotal support follow-up 3 months.

## 2021-11-19 ENCOUNTER — OFFICE VISIT (OUTPATIENT)
Dept: UROLOGY | Facility: CLINIC | Age: 26
End: 2021-11-19

## 2021-11-19 VITALS — HEIGHT: 72 IN | TEMPERATURE: 98.7 F | BODY MASS INDEX: 35.89 KG/M2 | WEIGHT: 265 LBS

## 2021-11-19 DIAGNOSIS — N50.811 PAIN IN BOTH TESTICLES: Primary | ICD-10-CM

## 2021-11-19 DIAGNOSIS — N50.812 PAIN IN BOTH TESTICLES: Primary | ICD-10-CM

## 2021-11-19 PROCEDURE — 99202 OFFICE O/P NEW SF 15 MIN: CPT | Performed by: PHYSICIAN ASSISTANT
